# Patient Record
Sex: FEMALE | Race: WHITE
[De-identification: names, ages, dates, MRNs, and addresses within clinical notes are randomized per-mention and may not be internally consistent; named-entity substitution may affect disease eponyms.]

---

## 2020-10-13 ENCOUNTER — HOSPITAL ENCOUNTER (EMERGENCY)
Dept: HOSPITAL 74 - JVIRT | Age: 60
Discharge: HOME | End: 2020-10-13
Payer: COMMERCIAL

## 2020-10-13 DIAGNOSIS — Z03.818: Primary | ICD-10-CM

## 2020-10-13 NOTE — TELE
HPI


Do you have fever,cough or shortness of breath?: No





- General


Reason For Visit: VIRTUAL VISIT


History Source: Patient





Past History





- Medical History


Allergies/Adverse Reactions: 


                                    Allergies











Allergy/AdvReac Type Severity Reaction Status Date / Time


 


Penicillins Allergy   Verified 11/04/12 21:01











Home Medications: 


Ambulatory Orders





Escitalopram Oxalate [Lexapro] 10 mg PO 11/04/12 


Levothyroxine [Synthroid] 50 mcg PO 11/04/12 








Thyroid Disease: Yes





- Immunization History


Td Vaccination: Yes





- Psycho-Social/Smoking History


Smoking Status: No


Smoking History: Never smoked


Number of Cigarettes Smoked Daily: 0


Cigars Per Day: 0





**Review of Systems





- Review of Systems


Constitutional: No: Fever


Respiratory: No: Cough





*Physical Exam





- Physical Exam


Respiratory/Chest: negative: Respiratory Distress





Discharge


Diagnosis at time of Disposition: 


 Encounter for screening laboratory testing for COVID-19 virus








- Referrals


Follow-up Referral(s): 


Gerry Wallace [Primary Care Provider] - 





- Patient Instructions





- Discharge


Disposition: HOME


Condition at time of Disposition: Stable

## 2020-10-13 NOTE — XMS
Summarization Of Episode

                           Created on:2020



Patient:MARBELLA TURNER

Sex:Female

:1960

External Reference #:[id number]





Demographics







                          Address                   [address]



                                                    [place] [address]

 

                          Home Phone                [phone]

 

                          Work Phone                [phone]

 

                          Preferred Language        Unknown

 

                          Marital Status            Unknown

 

                          Christianity Affiliation     NO

 

                          Race                      WH

 

                          Ethnic Group              Unknown









Author







                          Organization              [facility]









Support







                Name            Relationship    Address         Phone

 

                [facility] Unavailable     [address] [phone]



                                                [place] [address] 

 

                MADAI TURNER   BROTHER         [address] [phone]



                                                [place] [address] 









Re-disclosure Warning

The records that you are about to access may contain information from federally-
assisted alcohol or drug abuse programs. If such information is present, then 
the following federally mandated warning applies: This information has been 
disclosed to you from records protected by federal confidentiality rules (42 CFR
part 2). The federal rules prohibit you from making any further disclosure of 
this information unless further disclosure is expressly permitted by the written
consent of the person to whom it pertains or as otherwise permitted by 42 CFR 
part 2. A general authorization for the release of medical or other information 
is NOT sufficient for this purpose. The Federal rules restrict any use of the 
information to criminally investigate or prosecute any alcohol or drug abuse 
patient.The records that you are about to access may contain highly sensitive 
health information, the redisclosure of which is protected by Article 27-F of 
the [facility] Public Health law. If you continue you may haveaccess to 
information: Regarding HIV / AIDS; Provided by facilities licensed or operated 
by the [facility] Office of Mental Health; or Provided by the [facility]
Office for People With Developmental Disabilities. If such information is 
present, then the following [place] mandated warning applies: This 
information has been disclosed to you from confidential records which are 
protected by state law. State law prohibits you from making any further 
disclosure of this information without the specific written consent of the 
person to whom it pertains, or as otherwise permitted by law. Any unauthorized 
further disclosure in violation of state law may result in a fine or alf 
sentence or both. A general authorization for the release of medical or other 
information is NOT sufficient authorization for further disclosure.



Insurance Providers







          Payer name Policy type / Policy ID Covered   Covered party's Policy   

 Plan



                    Coverage type           party ID  relationship to [employer]    

Information



                                                  [employer]              

 

          [employer] P[employer]           [id number]           [id number]                  [id number]

5







Results







                    ID                  Date                Data Source

 

                    [id number]           2020 12:00:00 AM EDT [facility]











          Name      Value     Range     Interpretation Code Description Data Marguerite

rce(s) Supporting



                                                                      Document(s

)

 

          2019-nCoV                                         [place]    



          RNA XXX                                                     



          EVERETTE+probe-                                                   



          Imp                                                         









                                        [facility] reported by [employer].











                    ID                  Date                Data Source

 

                    [id number]        2020 07:47:00 AM EDT [facility]











          Name      Value     Range     Interpretation Code Description Data Marguerite

rce(s) Supporting



                                                                      Document(s

)

 

          SARS-CoV-2                                         [facility] 



          Ql                                                          



          EVERETTE+probe                                                   









                                        This lab was ordered by [facility] a

nd reported by [facility].









                                        Procedure